# Patient Record
Sex: MALE | Race: WHITE | NOT HISPANIC OR LATINO | Employment: STUDENT | URBAN - METROPOLITAN AREA
[De-identification: names, ages, dates, MRNs, and addresses within clinical notes are randomized per-mention and may not be internally consistent; named-entity substitution may affect disease eponyms.]

---
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
## 2020-08-12 ENCOUNTER — LAB VISIT (OUTPATIENT)
Dept: URGENT CARE | Facility: CLINIC | Age: 20
End: 2020-08-12
Payer: COMMERCIAL

## 2020-08-12 DIAGNOSIS — Z20.822 EXPOSURE TO COVID-19 VIRUS: ICD-10-CM

## 2020-08-12 PROCEDURE — U0003 INFECTIOUS AGENT DETECTION BY NUCLEIC ACID (DNA OR RNA); SEVERE ACUTE RESPIRATORY SYNDROME CORONAVIRUS 2 (SARS-COV-2) (CORONAVIRUS DISEASE [COVID-19]), AMPLIFIED PROBE TECHNIQUE, MAKING USE OF HIGH THROUGHPUT TECHNOLOGIES AS DESCRIBED BY CMS-2020-01-R: HCPCS

## 2020-08-13 LAB — SARS-COV-2 RNA RESP QL NAA+PROBE: NOT DETECTED

## 2023-01-05 ENCOUNTER — OFFICE VISIT (OUTPATIENT)
Dept: OTOLARYNGOLOGY | Facility: CLINIC | Age: 23
End: 2023-01-05
Payer: COMMERCIAL

## 2023-01-05 VITALS
HEIGHT: 72 IN | BODY MASS INDEX: 28.16 KG/M2 | HEART RATE: 67 BPM | WEIGHT: 207.88 LBS | DIASTOLIC BLOOD PRESSURE: 58 MMHG | SYSTOLIC BLOOD PRESSURE: 114 MMHG

## 2023-01-05 DIAGNOSIS — K13.0 MUCOCELE OF LOWER LIP: Primary | ICD-10-CM

## 2023-01-05 DIAGNOSIS — J30.9 CHRONIC ALLERGIC RHINITIS: ICD-10-CM

## 2023-01-05 PROCEDURE — 3078F PR MOST RECENT DIASTOLIC BLOOD PRESSURE < 80 MM HG: ICD-10-PCS | Mod: CPTII,S$GLB,, | Performed by: OTOLARYNGOLOGY

## 2023-01-05 PROCEDURE — 99203 OFFICE O/P NEW LOW 30 MIN: CPT | Mod: S$GLB,,, | Performed by: OTOLARYNGOLOGY

## 2023-01-05 PROCEDURE — 3074F PR MOST RECENT SYSTOLIC BLOOD PRESSURE < 130 MM HG: ICD-10-PCS | Mod: CPTII,S$GLB,, | Performed by: OTOLARYNGOLOGY

## 2023-01-05 PROCEDURE — 3078F DIAST BP <80 MM HG: CPT | Mod: CPTII,S$GLB,, | Performed by: OTOLARYNGOLOGY

## 2023-01-05 PROCEDURE — 1159F MED LIST DOCD IN RCRD: CPT | Mod: CPTII,S$GLB,, | Performed by: OTOLARYNGOLOGY

## 2023-01-05 PROCEDURE — 99999 PR PBB SHADOW E&M-EST. PATIENT-LVL III: ICD-10-PCS | Mod: PBBFAC,,, | Performed by: OTOLARYNGOLOGY

## 2023-01-05 PROCEDURE — 3008F PR BODY MASS INDEX (BMI) DOCUMENTED: ICD-10-PCS | Mod: CPTII,S$GLB,, | Performed by: OTOLARYNGOLOGY

## 2023-01-05 PROCEDURE — 1159F PR MEDICATION LIST DOCUMENTED IN MEDICAL RECORD: ICD-10-PCS | Mod: CPTII,S$GLB,, | Performed by: OTOLARYNGOLOGY

## 2023-01-05 PROCEDURE — 1160F RVW MEDS BY RX/DR IN RCRD: CPT | Mod: CPTII,S$GLB,, | Performed by: OTOLARYNGOLOGY

## 2023-01-05 PROCEDURE — 99203 PR OFFICE/OUTPT VISIT, NEW, LEVL III, 30-44 MIN: ICD-10-PCS | Mod: S$GLB,,, | Performed by: OTOLARYNGOLOGY

## 2023-01-05 PROCEDURE — 99999 PR PBB SHADOW E&M-EST. PATIENT-LVL III: CPT | Mod: PBBFAC,,, | Performed by: OTOLARYNGOLOGY

## 2023-01-05 PROCEDURE — 3074F SYST BP LT 130 MM HG: CPT | Mod: CPTII,S$GLB,, | Performed by: OTOLARYNGOLOGY

## 2023-01-05 PROCEDURE — 1160F PR REVIEW ALL MEDS BY PRESCRIBER/CLIN PHARMACIST DOCUMENTED: ICD-10-PCS | Mod: CPTII,S$GLB,, | Performed by: OTOLARYNGOLOGY

## 2023-01-05 PROCEDURE — 3008F BODY MASS INDEX DOCD: CPT | Mod: CPTII,S$GLB,, | Performed by: OTOLARYNGOLOGY

## 2023-01-05 NOTE — PROGRESS NOTES
Subjective:      [place] Jules is a 22 y.o. male who was referred to me by Dr. Jose Diaz in consultation for an oral cavity lesion which has been present for 1 month. He notes that it was swollen for about 1 week and then he put pressure on it to pop it. He did get a yellow fluid from it and then it returned.  He denies any pain at this time. No bleeding from the lesion. No ulcers.      He is not an active smoker.  He does use smokeless tobacco.    Past Medical History  He has no past medical history on file.    Past Surgical History  He has no past surgical history on file.    Family History  His family history includes Skin cancer in his maternal grandfather.    Social History  He reports that he has never smoked. He has never used smokeless tobacco.    Allergies  He is allergic to augmentin [amoxicillin-pot clavulanate].    Medications  He currently has no medications in their medication list.    Review of Systems   Constitutional:  Negative for activity change and unexpected weight change.   Eyes:  Negative for pain and visual disturbance.   Respiratory:  Negative for shortness of breath and stridor.    Cardiovascular:  Negative for chest pain and leg swelling.   Gastrointestinal:  Negative for abdominal pain and nausea.   Endocrine: Negative for cold intolerance and heat intolerance.   Musculoskeletal:  Negative for gait problem and joint swelling.   Skin:  Negative for color change and wound.   Allergic/Immunologic: Negative for immunocompromised state.   Neurological:  Negative for seizures and weakness.   Hematological:  Negative for adenopathy. Does not bruise/bleed easily.   Psychiatric/Behavioral:  Negative for agitation and confusion.    All other systems reviewed and are negative.       Objective:     BP (!) 114/58 (BP Location: Left arm, Patient Position: Sitting, BP Method: Large (Automatic))   Pulse 67   Ht 6' (1.829 m)   Wt 94.3 kg (207 lb 14.3 oz)   BMI 28.20 kg/m²    Physical  Exam  Physical Exam     Vitals:    01/05/23 1033   BP: (!) 114/58   Pulse: 67       Constitutional: Well appearing / communicating.  NAD.  Eyes: EOM I Bilaterally  Head/Face: Normocephalic.  Negative paranasal sinus pressure/tenderness.  Salivary glands WNL.  House Brackmann I Bilaterally.    Right Ear: External Auditory Canal WNL,TM w/o masses/lesions/perforations.  Auricle WNL.  Left Ear: External Auditory Canal WNL,TM w/o masses/lesions/perforations. Auricle WNL.  Nose: No gross nasal septal deviation. Inferior Turbinates 3+ bilaterally. + crusting bilaterally. No septal perforation. No masses/lesions. External nasal skin without masses/lesions.  Oral Cavity: Gingiva/lips: right lower lip with 1.5 cm submucosal lesion, soft, mobile, well circumscribed. FOM Soft, no masses palpated. Oral Tongue mobile. Hard Palate WNL.   Oropharynx: BOT WNL. No masses/lesions noted. Tonsillar fossa/pharyngeal wall without lesions. Posterior oropharynx WNL.  Soft palate without masses. Midline uvula.   Neck/Lymphatic: No LAD I-VI bilaterally.  No thyromegaly.  No masses noted on exam.          Assessment:     1. Mucocele of lower lip    2. Chronic allergic rhinitis         Plan:     I had a long discussion with the patient regarding his condition and the further workup and management options.  Options include observation versus excisional biopsy of lower lip mucocele. This lesion is amenable to removal in-office. We discussed risks/benefits of the procedure versus observation. He would like to proceed with removal.   May start Flonase 2 sprays per nostril daily for nasal congestion.     I spent a total of 30 minutes on the day of the visit.  This includes face to face time and non-face to face time preparing to see the patient (eg, review of tests), obtaining and/or reviewing separately obtained history, documenting clinical information in the electronic or other health record.     Rosalee Simms MD

## 2023-01-10 ENCOUNTER — PROCEDURE VISIT (OUTPATIENT)
Dept: OTOLARYNGOLOGY | Facility: CLINIC | Age: 23
End: 2023-01-10
Payer: COMMERCIAL

## 2023-01-10 VITALS
WEIGHT: 206 LBS | DIASTOLIC BLOOD PRESSURE: 66 MMHG | SYSTOLIC BLOOD PRESSURE: 129 MMHG | HEART RATE: 77 BPM | HEIGHT: 72 IN | TEMPERATURE: 98 F | BODY MASS INDEX: 27.9 KG/M2

## 2023-01-10 DIAGNOSIS — K13.0 MUCOCELE OF LOWER LIP: Primary | ICD-10-CM

## 2023-01-10 PROCEDURE — 40812 EXCISE/REPAIR MOUTH LESION: CPT | Mod: S$GLB,,, | Performed by: OTOLARYNGOLOGY

## 2023-01-10 PROCEDURE — 40812 PR EXCIS MOUTH MUCOSA/SUB,SIMPL REPAIR: ICD-10-PCS | Mod: S$GLB,,, | Performed by: OTOLARYNGOLOGY

## 2023-01-10 PROCEDURE — 88305 TISSUE EXAM BY PATHOLOGIST: ICD-10-PCS | Mod: 26,,, | Performed by: PATHOLOGY

## 2023-01-10 PROCEDURE — 88305 TISSUE EXAM BY PATHOLOGIST: CPT | Mod: 26,,, | Performed by: PATHOLOGY

## 2023-01-10 PROCEDURE — 88305 TISSUE EXAM BY PATHOLOGIST: CPT | Performed by: PATHOLOGY

## 2023-01-11 ENCOUNTER — TELEPHONE (OUTPATIENT)
Dept: OTOLARYNGOLOGY | Facility: CLINIC | Age: 23
End: 2023-01-11
Payer: COMMERCIAL

## 2023-01-11 RX ORDER — NAPROXEN 500 MG/1
500 TABLET ORAL 2 TIMES DAILY WITH MEALS
Qty: 14 TABLET | Refills: 0 | Status: SHIPPED | OUTPATIENT
Start: 2023-01-11 | End: 2023-01-18

## 2023-01-11 NOTE — TELEPHONE ENCOUNTER
Message sent to provider to send meds from yesterdays visit to [facility] [place].  ----- Message from Angela Garrett sent at 1/11/2023  8:52 AM CST -----  Type:  Needs Medical Advice    Who Called: Pt   Would the patient rather a call back or a response via [facility]? Call   Best Call Back Number: [phone]  Additional Information:   Pt stated he was told by provider he has medication at his pharmacy listed below  [facility] #[id number] - [place], [place] - [address]    On epic no medication is found

## 2023-01-12 NOTE — PROCEDURES
Procedures    Pre-Procedure diagnosis:  Lower lip mucocele    Postprocedure:  diagnosis: Same    Procedure:  excisional biopsy of lower lip mucocele    Specimens:  Lower lip lesion    Estimated blood loss: less than 1 mL    Complications: None    Procedure in detail:  We discussed the risks, benefits, indications, and alternatives to oral biopsy. I explained that the risks of biopsy in the oral cavity include, but are not limited to, infection, bleeding, scarring, recurrence, failure to achieve a diagnosis, and the need for additional procedures.  Time was allowed for questions, and all questions were answered to the patient's apparent satisfaction.  Informed consent was obtained.    A timeout was performed according to the universal protocol with full patient participation. Topical 2% lidocaine was placed on dental roll and placed over the area of the lesion.  1% lidocaine with 1:100,000 epinephrine was then injected submucosally in the region of the lesion with a 27-gauge needle.  The patient was draped in the standard fashion.  The cystic lesion was identified. A mucosal incision was made overlying the 1.5 cm cystic lesion. The cyst/mucocele was then dissected circumferentially from the submucosal tissue using a curved iris scissors. The cyst was removed en total and  passed off the table for permanent pathologic analysis.  Hemostasis was achieved with direct pressure.  The wound was closed with interrupted 3-0 chromic suture.  The patient tolerated this procedure well.  There were no apparent complications.

## 2023-01-17 ENCOUNTER — OFFICE VISIT (OUTPATIENT)
Dept: OTOLARYNGOLOGY | Facility: CLINIC | Age: 23
End: 2023-01-17
Payer: COMMERCIAL

## 2023-01-17 VITALS
SYSTOLIC BLOOD PRESSURE: 118 MMHG | HEART RATE: 72 BPM | WEIGHT: 212.88 LBS | BODY MASS INDEX: 28.83 KG/M2 | HEIGHT: 72 IN | DIASTOLIC BLOOD PRESSURE: 75 MMHG

## 2023-01-17 DIAGNOSIS — K13.0 MUCOCELE OF LOWER LIP: Primary | ICD-10-CM

## 2023-01-17 DIAGNOSIS — J30.9 CHRONIC ALLERGIC RHINITIS: Chronic | ICD-10-CM

## 2023-01-17 PROCEDURE — 3008F BODY MASS INDEX DOCD: CPT | Mod: CPTII,S$GLB,, | Performed by: OTOLARYNGOLOGY

## 2023-01-17 PROCEDURE — 1160F PR REVIEW ALL MEDS BY PRESCRIBER/CLIN PHARMACIST DOCUMENTED: ICD-10-PCS | Mod: CPTII,S$GLB,, | Performed by: OTOLARYNGOLOGY

## 2023-01-17 PROCEDURE — 3078F PR MOST RECENT DIASTOLIC BLOOD PRESSURE < 80 MM HG: ICD-10-PCS | Mod: CPTII,S$GLB,, | Performed by: OTOLARYNGOLOGY

## 2023-01-17 PROCEDURE — 1159F MED LIST DOCD IN RCRD: CPT | Mod: CPTII,S$GLB,, | Performed by: OTOLARYNGOLOGY

## 2023-01-17 PROCEDURE — 99212 OFFICE O/P EST SF 10 MIN: CPT | Mod: 25,S$GLB,, | Performed by: OTOLARYNGOLOGY

## 2023-01-17 PROCEDURE — 3008F PR BODY MASS INDEX (BMI) DOCUMENTED: ICD-10-PCS | Mod: CPTII,S$GLB,, | Performed by: OTOLARYNGOLOGY

## 2023-01-17 PROCEDURE — 3074F SYST BP LT 130 MM HG: CPT | Mod: CPTII,S$GLB,, | Performed by: OTOLARYNGOLOGY

## 2023-01-17 PROCEDURE — 99212 PR OFFICE/OUTPT VISIT, EST, LEVL II, 10-19 MIN: ICD-10-PCS | Mod: 25,S$GLB,, | Performed by: OTOLARYNGOLOGY

## 2023-01-17 PROCEDURE — 3074F PR MOST RECENT SYSTOLIC BLOOD PRESSURE < 130 MM HG: ICD-10-PCS | Mod: CPTII,S$GLB,, | Performed by: OTOLARYNGOLOGY

## 2023-01-17 PROCEDURE — 3078F DIAST BP <80 MM HG: CPT | Mod: CPTII,S$GLB,, | Performed by: OTOLARYNGOLOGY

## 2023-01-17 PROCEDURE — 1159F PR MEDICATION LIST DOCUMENTED IN MEDICAL RECORD: ICD-10-PCS | Mod: CPTII,S$GLB,, | Performed by: OTOLARYNGOLOGY

## 2023-01-17 PROCEDURE — 99999 PR PBB SHADOW E&M-EST. PATIENT-LVL III: CPT | Mod: PBBFAC,,, | Performed by: OTOLARYNGOLOGY

## 2023-01-17 PROCEDURE — 99999 PR PBB SHADOW E&M-EST. PATIENT-LVL III: ICD-10-PCS | Mod: PBBFAC,,, | Performed by: OTOLARYNGOLOGY

## 2023-01-17 PROCEDURE — 1160F RVW MEDS BY RX/DR IN RCRD: CPT | Mod: CPTII,S$GLB,, | Performed by: OTOLARYNGOLOGY

## 2023-01-17 NOTE — PROGRESS NOTES
Subjective:      [place] Jules is a 22 y.o. male who was referred to me by Dr. Jose Diaz in consultation for an oral cavity lesion which has been present for 1 month. He notes that it was swollen for about 1 week and then he put pressure on it to pop it. He did get a yellow fluid from it and then it returned.  He denies any pain at this time. No bleeding from the lesion. No ulcers.      He is not an active smoker.  He does use smokeless tobacco.    Interval HPI 1/17/2022:   Follow up s/p lower lip lesion biopsy 1/10/2023. Doing well. No complaints. Pathology pending.     Past Medical History  He has no past medical history on file.    Past Surgical History  He has no past surgical history on file.    Family History  His family history includes Skin cancer in his maternal grandfather.    Social History  He reports that he has never smoked. He has never used smokeless tobacco.    Allergies  He is allergic to augmentin [amoxicillin-pot clavulanate].    Medications  He has a current medication list which includes the following prescription(s): naproxen.    Review of Systems   Constitutional:  Negative for activity change and unexpected weight change.   Eyes:  Negative for pain and visual disturbance.   Respiratory:  Negative for shortness of breath and stridor.    Cardiovascular:  Negative for chest pain and leg swelling.   Gastrointestinal:  Negative for abdominal pain and nausea.   Endocrine: Negative for cold intolerance and heat intolerance.   Musculoskeletal:  Negative for gait problem and joint swelling.   Skin:  Negative for color change and wound.   Allergic/Immunologic: Negative for immunocompromised state.   Neurological:  Negative for seizures and weakness.   Hematological:  Negative for adenopathy. Does not bruise/bleed easily.   Psychiatric/Behavioral:  Negative for agitation and confusion.    All other systems reviewed and are negative.       Objective:     /75 (BP Location: Right arm,  Patient Position: Sitting, BP Method: X-Large (Automatic))   Pulse 72   Ht 6' (1.829 m)   Wt 96.6 kg (212 lb 13.7 oz)   BMI 28.87 kg/m²    Physical Exam  Physical Exam     Vitals:    01/17/23 1020   BP: 118/75   Pulse: 72       Constitutional: Well appearing / communicating.  NAD.  Eyes: EOM I Bilaterally  Head/Face: Normocephalic.  Negative paranasal sinus pressure/tenderness.  Salivary glands WNL.  House Brackmann I Bilaterally.    Right Ear: External Auditory Canal WNL,TM w/o masses/lesions/perforations.  Auricle WNL.  Left Ear: External Auditory Canal WNL,TM w/o masses/lesions/perforations. Auricle WNL.  Nose: No gross nasal septal deviation. Inferior Turbinates 3+ bilaterally. + crusting bilaterally. No septal perforation. No masses/lesions. External nasal skin without masses/lesions.  Oral Cavity: Gingiva/lips: right lower lip biopsy site healing well. FOM Soft, no masses palpated. Oral Tongue mobile. Hard Palate WNL.   Oropharynx: BOT WNL. No masses/lesions noted. Tonsillar fossa/pharyngeal wall without lesions. Posterior oropharynx WNL.  Soft palate without masses. Midline uvula.   Neck/Lymphatic: No LAD I-VI bilaterally.  No thyromegaly.  No masses noted on exam.          Assessment:     1. Mucocele of lower lip    2. Chronic allergic rhinitis           Plan:     Biopsy site healing well. Pathology results pending. WIll contact patient with results.   Continue Flonase 2 sprays per nostril daily for nasal congestion.       Rosalee Simms MD

## 2023-01-18 ENCOUNTER — TELEPHONE (OUTPATIENT)
Dept: OTOLARYNGOLOGY | Facility: CLINIC | Age: 23
End: 2023-01-18
Payer: COMMERCIAL

## 2023-01-18 LAB
FINAL PATHOLOGIC DIAGNOSIS: NORMAL
Lab: NORMAL

## 2023-01-18 NOTE — TELEPHONE ENCOUNTER
----- Message from Rosalee Simms MD sent at 1/18/2023  1:14 PM CST -----  Please notify [place] that pathology results are consistent with mucocele.     ----- Message -----  From: [facility]  Sent: 1/18/2023  12:38 PM CST  To: Rosalee Simms MD